# Patient Record
Sex: FEMALE | Race: WHITE | NOT HISPANIC OR LATINO | Employment: FULL TIME | URBAN - METROPOLITAN AREA
[De-identification: names, ages, dates, MRNs, and addresses within clinical notes are randomized per-mention and may not be internally consistent; named-entity substitution may affect disease eponyms.]

---

## 2023-04-19 ENCOUNTER — OFFICE VISIT (OUTPATIENT)
Dept: PRIMARY CARE | Facility: CLINIC | Age: 28
End: 2023-04-19
Payer: COMMERCIAL

## 2023-04-19 VITALS
DIASTOLIC BLOOD PRESSURE: 80 MMHG | SYSTOLIC BLOOD PRESSURE: 111 MMHG | HEART RATE: 104 BPM | RESPIRATION RATE: 16 BRPM | OXYGEN SATURATION: 98 % | TEMPERATURE: 97.5 F | WEIGHT: 203 LBS | HEIGHT: 67 IN | BODY MASS INDEX: 31.86 KG/M2

## 2023-04-19 DIAGNOSIS — Z00.00 ANNUAL PHYSICAL EXAM: Primary | ICD-10-CM

## 2023-04-19 DIAGNOSIS — E66.09 CLASS 1 OBESITY DUE TO EXCESS CALORIES WITHOUT SERIOUS COMORBIDITY WITH BODY MASS INDEX (BMI) OF 31.0 TO 31.9 IN ADULT: ICD-10-CM

## 2023-04-19 DIAGNOSIS — Z13.220 LIPID SCREENING: ICD-10-CM

## 2023-04-19 PROCEDURE — 3008F BODY MASS INDEX DOCD: CPT | Performed by: PHYSICIAN ASSISTANT

## 2023-04-19 PROCEDURE — 1036F TOBACCO NON-USER: CPT | Performed by: PHYSICIAN ASSISTANT

## 2023-04-19 PROCEDURE — 99385 PREV VISIT NEW AGE 18-39: CPT | Performed by: PHYSICIAN ASSISTANT

## 2023-04-19 RX ORDER — DROSPIRENONE AND ETHINYL ESTRADIOL 0.02-3(28)
KIT ORAL DAILY
COMMUNITY
Start: 2023-04-10

## 2023-04-19 ASSESSMENT — ENCOUNTER SYMPTOMS
NAUSEA: 0
VOMITING: 0
EYE PAIN: 0
ABDOMINAL PAIN: 0
MYALGIAS: 0
ARTHRALGIAS: 0
SHORTNESS OF BREATH: 0
DIARRHEA: 0
CONSTIPATION: 0

## 2023-04-19 ASSESSMENT — PATIENT HEALTH QUESTIONNAIRE - PHQ9
1. LITTLE INTEREST OR PLEASURE IN DOING THINGS: NOT AT ALL
2. FEELING DOWN, DEPRESSED OR HOPELESS: NOT AT ALL
SUM OF ALL RESPONSES TO PHQ9 QUESTIONS 1 AND 2: 0

## 2023-04-19 NOTE — ASSESSMENT & PLAN NOTE
OBESITY  - BMI is 31.79 kg/m2  - Other cardiac risk factors: none   - Pt is aware of the health benefits of lower body weight and the risks associated with obesity.   - Pt advised to increase physical activity with a goal of 30 minutes of cardiovascular/ aerobic exercise daily. I encouraged she add weight training as well.    - Pt advised to improve the diet - less fatty foods, red meat and sweets,  more fruits, vegetables, fish, and whole grains.   - Follow up 3 months to assess response to these measures.   - All the pt's questions were answered. The pt expressed understanding and agreed to the plan.

## 2023-04-19 NOTE — PROGRESS NOTES
"Subjective   Patient ID: Jorge Degroot is a 27 y.o. female who presents for Annual Exam (Check up/Pt started her birth control back in 2014 every since she can't lose weight even if she does exercise  /Pt states she dislocated her elbow last year but its in place now.).    HPI   Prevent/ Wellness Visit:   - Lives at home in [place] with  ( since October but lived together 7 years).  is a . Has a big dog Doberman named Jason   - Employment -  at [employer] - loves her job!   - Labs: DUE, ordered today   - PAP: UTD   Immunization History   Administered Date(s) Administered    Moderna SARS-CoV-2 Vaccination 08/17/2021, 09/14/2021   - Td: ?   - COVID-19 vax: UTD   - Diet: balanced  - Coffee: drinks 3 cups a day - trying to cut back, down to 1 cup a day   - Exercise: yes, walks her dog almost daily   - Sexual hx: yes with   - Tobacco: no   - Illicit drugs: no   - Alcohol: occasionally, every other week   - Dentist: yes   - Eye exam: no   - Seat belt: no   - Working smoke detector: yes   - Living will: no     Obese  - BMI 31.79 kg/m2   - has been on birth control for almost 10 years, feels like keeps gaiing weight and trouble losing it  - Eats one full meal and for lunch will have a sandwhich  - Not a snacker       Review of Systems   Eyes:  Negative for pain and visual disturbance.   Respiratory:  Negative for shortness of breath.    Cardiovascular:  Negative for chest pain.   Gastrointestinal:  Negative for abdominal pain, constipation, diarrhea, nausea and vomiting.   Musculoskeletal:  Negative for arthralgias and myalgias.   Skin:  Negative for rash.       Objective   /80 (BP Location: Right arm, Patient Position: Sitting, BP Cuff Size: Large adult)   Pulse 104   Temp 36.4 °C (97.5 °F) (Temporal)   Resp 16   Ht 1.702 m (5' 7\")   Wt 92.1 kg (203 lb)   SpO2 98%   BMI 31.79 kg/m²     Physical Exam  Constitutional:       General: She is not in " acute distress.     Appearance: She is obese.   HENT:      Head: Normocephalic.      Right Ear: Tympanic membrane and ear canal normal.      Left Ear: Tympanic membrane and ear canal normal.      Nose: Nose normal.      Mouth/Throat:      Mouth: Mucous membranes are moist.      Pharynx: Oropharynx is clear.   Eyes:      Extraocular Movements: Extraocular movements intact.      Conjunctiva/sclera: Conjunctivae normal.      Pupils: Pupils are equal, round, and reactive to light.   Cardiovascular:      Rate and Rhythm: Normal rate and regular rhythm.      Pulses: Normal pulses.      Heart sounds: No murmur heard.  Pulmonary:      Effort: Pulmonary effort is normal.      Breath sounds: Normal breath sounds. No wheezing, rhonchi or rales.   Abdominal:      General: Bowel sounds are normal. There is no distension.      Palpations: Abdomen is soft. There is no mass.      Tenderness: There is no abdominal tenderness. There is no guarding.   Musculoskeletal:         General: Normal range of motion.      Cervical back: Neck supple.   Lymphadenopathy:      Cervical: No cervical adenopathy.   Skin:     General: Skin is warm and dry.      Findings: No lesion or rash.   Neurological:      General: No focal deficit present.      Mental Status: She is alert.      Gait: Gait normal.   Psychiatric:         Mood and Affect: Mood normal.         Assessment/Plan   Problem List Items Addressed This Visit          Endocrine/Metabolic    Class 1 obesity due to excess calories without serious comorbidity with body mass index (BMI) of 31.0 to 31.9 in adult     OBESITY  - BMI is 31.79 kg/m2  - Other cardiac risk factors: none   - Pt is aware of the health benefits of lower body weight and the risks associated with obesity.   - Pt advised to increase physical activity with a goal of 30 minutes of cardiovascular/ aerobic exercise daily. I encouraged she add weight training as well.    - Pt advised to improve the diet - less fatty foods, red  meat and sweets,  more fruits, vegetables, fish, and whole grains.   - Follow up 3 months to assess response to these measures.   - All the pt's questions were answered. The pt expressed understanding and agreed to the plan.          Relevant Orders    TSH with reflex to Free T4 if abnormal       Other    Annual physical exam - Primary     PREVENTIVE CARE SCREENING:  - CBC/ CMP / Lipid labs: DUE, ordered today   - PAP (21- 29 q 3 yrs, 30 - 65 q 5 yrs with HPV co-testing): UTD, sees GYN   - Mood is good  - Home life is good - lives with  and their dog Sparticus (the doberman)   - Work life is good - works as  at [employer] - loves her job!   - Discussed DIET - doing well with it overall, encouraged she continue to reduce snacks, processed foods, sugary and greasy foods, fast foods. Increase healthy alternatives, whole grains, fruits vegetables.)   - Discussed EXERCISE - Recommended weight training for bone health and 30 minutes of cardiovascular exercise 5-7 days a week.  - Encouraged use of sunscreen daily  - Encouraged pt to get her yearly eye and dental exams  - Encouraged to wear seatbelt   - Limit alcohol consumption.  - Encouraged she consider filling out [place] Advanced Directives          Relevant Orders    Comprehensive Metabolic Panel    CBC    Lipid Panel    Hemoglobin A1C     Other Visit Diagnoses       Lipid screening        Relevant Orders    Lipid Panel

## 2023-04-19 NOTE — ASSESSMENT & PLAN NOTE
PREVENTIVE CARE SCREENING:  - CBC/ CMP / Lipid labs: DUE, ordered today   - PAP (21- 29 q 3 yrs, 30 - 65 q 5 yrs with HPV co-testing): RACHELLE, sees GYN   - Mood is good  - Home life is good - lives with  and their dog Sparticus (the doberman)   - Work life is good - works as  at [employer] - loves her job!   - Discussed DIET - doing well with it overall, encouraged she continue to reduce snacks, processed foods, sugary and greasy foods, fast foods. Increase healthy alternatives, whole grains, fruits vegetables.)   - Discussed EXERCISE - Recommended weight training for bone health and 30 minutes of cardiovascular exercise 5-7 days a week.  - Encouraged use of sunscreen daily  - Encouraged pt to get her yearly eye and dental exams  - Encouraged to wear seatbelt   - Limit alcohol consumption.  - Encouraged she consider filling out [place] Advanced Directives